# Patient Record
(demographics unavailable — no encounter records)

---

## 2024-12-10 NOTE — PHYSICAL EXAM
Appropriate/Calm [Left] : left shoulder [NL (0-180)] : full active abduction 0-180 degrees [NL (0-70)] : full internal rotation 0-70 degrees [NL (0-90)] : full external rotation 0-90 degrees [] : negative impingement testing

## 2024-12-10 NOTE — HISTORY OF PRESENT ILLNESS
[2] : 2 [10] : 10 [Full time] : Work status: full time [de-identified] : 12/10/2024: 43-year-old female presenting with LT shoulder pain. No known injury/fall. Experiencing burning pain for 1 month. No prior treatment. Reports most pain is at nighttime.  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

## 2024-12-10 NOTE — HISTORY OF PRESENT ILLNESS
[2] : 2 [10] : 10 [Full time] : Work status: full time [de-identified] : 12/10/2024: 43-year-old female presenting with LT shoulder pain. No known injury/fall. Experiencing burning pain for 1 month. No prior treatment. Reports most pain is at nighttime.  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

## 2024-12-10 NOTE — DISCUSSION/SUMMARY
[de-identified] : 42yo female presents with biceps tendinitis of left shoulder   1) instructed to attend PT 2) use of cryotherapy, rest, activity modification  3) Naproxen rx sent - r/b/a discussed - instructed how to take medication - take for a week straight  4) RTC in 4-6 weeks

## 2024-12-10 NOTE — PHYSICAL EXAM
[Left] : left shoulder [NL (0-180)] : full active abduction 0-180 degrees [NL (0-70)] : full internal rotation 0-70 degrees [NL (0-90)] : full external rotation 0-90 degrees [] : negative impingement testing

## 2024-12-10 NOTE — DISCUSSION/SUMMARY
[de-identified] : 44yo female presents with biceps tendinitis of left shoulder   1) instructed to attend PT 2) use of cryotherapy, rest, activity modification  3) Naproxen rx sent - r/b/a discussed - instructed how to take medication - take for a week straight  4) RTC in 4-6 weeks

## 2025-05-08 NOTE — DISCUSSION/SUMMARY
[de-identified] : 43f with left shoulder biceps tendinitis and adhesive capsulitis. Persistent pain and limited ROM over the past 5 months.  The patient was advised of the diagnosis. The natural history of the pathology was explained in full to the patient in layman's terms. All questions were answered.  1) MRI left shoulder, eval rotator cuff  2) cryotherapy, rest and activity modification  3) rtc after MRI   Entered by Tanvi Ordoñez acting as scribe. Dr. Hillman- The documentation recorded by the scribe accurately reflects the service I personally performed and the decisions made by me.

## 2025-05-08 NOTE — HISTORY OF PRESENT ILLNESS
[2] : 2 [10] : 10 [Full time] : Work status: full time [de-identified] : 05/08/25: Pt here to f/u for Left shoulder pain. Pt started PT at [facility] in [place] 2-3x a week. Pt states PT was not helpful and pain is about the same as it was in December 2024. Reports that her left shoulder pain can be intense at times. Pt took Naproxen for some relief.  12/10/2024: 43-year-old female presenting with LT shoulder pain. No known injury/fall. Experiencing burning pain for 1 month. No prior treatment. Reports most pain is at nighttime.  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

## 2025-05-08 NOTE — PHYSICAL EXAM
[Left] : left shoulder [] : negative [place] [FreeTextEntry9] : 170 [TWNoteComboBox7] : active forward flexion 120 degrees [de-identified] : active abduction 100 degrees [de-identified] : external rotation at 90 degrees of abduction 60 degrees

## 2025-05-23 NOTE — PHYSICAL EXAM
[Left] : left shoulder [] : negative [place] [FreeTextEntry9] : 170 [TWNoteComboBox7] : active forward flexion 120 degrees [de-identified] : active abduction 100 degrees [de-identified] : external rotation at 90 degrees of abduction 60 degrees

## 2025-05-23 NOTE — DISCUSSION/SUMMARY
[de-identified] : 43f with MRI of the left shoulder showing moderate adhesive capsulitis, moderate GH joint effusion. Mild supraspinatus tendinitis and slight AC joint hypertrophy.  The MRI results and images were reviewed with the patient. The patient was advised of the diagnosis. The natural history of the pathology was explained in full to the patient in layman's terms. All questions were answered.  1) csi left shoulder today - tolerated well  2) cryotherapy, rest and activity modification  3) c/w PT and hep  4) rtc 6 weeks   Entered by Tanvi Ordoñez acting as scribe. Dr. Hillman- The documentation recorded by the scribe accurately reflects the service I personally performed and the decisions made by me.

## 2025-05-23 NOTE — PHYSICAL EXAM
[Left] : left shoulder [] : negative [place] [FreeTextEntry9] : 170 [TWNoteComboBox7] : active forward flexion 120 degrees [de-identified] : active abduction 100 degrees [de-identified] : external rotation at 90 degrees of abduction 60 degrees

## 2025-05-23 NOTE — PROCEDURE
[FreeTextEntry3] : Large joint injection was performed of the left shoulder. An injection of Lidocaine 3cc of 1% , Bupivacaine (Marcaine) 6cc of 0.5% , Triamcinolone (Kenalog) 2cc of 40 mg  was used. Patient was advised to call if redness, pain or fever occur and apply ice for 15 minutes out of every hour for the next 12-24 hours as tolerated.   Patient has tried OTC's including aspirin, Ibuprofen, Aleve, etc or prescription NSAIDS, and/or exercises at home and/or physical therapy without satisfactory response, patient had decreased mobility in the joint and the risks benefits, and alternatives have been discussed, and verbal consent was obtained. The site was prepped with alcohol, betadine and ethyl chloride sprayed topically   The risks, benefits and contents of the injection have been discussed.  Risks include but are not limited to allergic reaction, flare reaction, permanent white skin discoloration at the injection site and infection.  The patient understands the risks and agrees to having the injection.  All questions have been answered.   Ultrasonic guidance separate from the diagnostic ultrasound would be necessary to ensure proper placement of the needle and medication into the correct space/structures. Inadvertent injection into the substance of the rotator cuff tendon has been shown in several studies to result in potential damage to the tendon. Multiple studies have confirmed the improved accuracy of placing the needle correctly into the subacromial space when ultrasound guidance is used. All ultrasound images have been permanently captured and stored accordingly in our picture archiving and communication system.

## 2025-05-23 NOTE — DATA REVIEWED
[MRI] : MRI [Left] : left [Shoulder] : shoulder [Report was reviewed and noted in the chart] : The report was reviewed and noted in the chart [I independently reviewed and interpreted images and report] : I independently reviewed and interpreted images and report [I reviewed the films/CD] : I reviewed the films/CD [FreeTextEntry1] : 5/10/25 [facility] - independent interpretation on 05/20/2025: moderate adhesive capsulitis, moderate GH joint effusion. Mild supraspinatus tendinitis and slight AC joint hypertrophy.

## 2025-05-23 NOTE — HISTORY OF PRESENT ILLNESS
[2] : 2 [10] : 10 [Full time] : Work status: full time [de-identified] : 05/20/25: Pt here for left shoulder pain f/u and to review MRI results performed at [facility] 05/10/25. Reports no significant change in symptoms since last visit.  05/08/25: Pt here to f/u for Left shoulder pain. Pt started PT at [facility] in [place] 2-3x a week. Pt states PT was not helpful and pain is about the same as it was in December 2024. Reports that her left shoulder pain can be intense at times. Pt took Naproxen for some relief.  12/10/2024: 43-year-old female presenting with LT shoulder pain. No known injury/fall. Experiencing burning pain for 1 month. No prior treatment. Reports most pain is at nighttime.  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

## 2025-05-23 NOTE — DISCUSSION/SUMMARY
[de-identified] : 43f with MRI of the left shoulder showing moderate adhesive capsulitis, moderate GH joint effusion. Mild supraspinatus tendinitis and slight AC joint hypertrophy.  The MRI results and images were reviewed with the patient. The patient was advised of the diagnosis. The natural history of the pathology was explained in full to the patient in layman's terms. All questions were answered.  1) csi left shoulder today - tolerated well  2) cryotherapy, rest and activity modification  3) c/w PT and hep  4) rtc 6 weeks   Entered by Tanvi Ordñoez acting as scribe. Dr. Hillman- The documentation recorded by the scribe accurately reflects the service I personally performed and the decisions made by me.

## 2025-05-23 NOTE — HISTORY OF PRESENT ILLNESS
[2] : 2 [10] : 10 [Full time] : Work status: full time [de-identified] : 05/20/25: Pt here for left shoulder pain f/u and to review MRI results performed at [facility] 05/10/25. Reports no significant change in symptoms since last visit.  05/08/25: Pt here to f/u for Left shoulder pain. Pt started PT at [facility] in [place] 2-3x a week. Pt states PT was not helpful and pain is about the same as it was in December 2024. Reports that her left shoulder pain can be intense at times. Pt took Naproxen for some relief.  12/10/2024: 43-year-old female presenting with LT shoulder pain. No known injury/fall. Experiencing burning pain for 1 month. No prior treatment. Reports most pain is at nighttime.  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